# Patient Record
Sex: MALE | Race: WHITE | Employment: OTHER | URBAN - METROPOLITAN AREA
[De-identification: names, ages, dates, MRNs, and addresses within clinical notes are randomized per-mention and may not be internally consistent; named-entity substitution may affect disease eponyms.]

---

## 2020-07-12 ENCOUNTER — APPOINTMENT (OUTPATIENT)
Dept: GENERAL RADIOLOGY | Age: 85
End: 2020-07-12
Payer: MEDICARE

## 2020-07-12 ENCOUNTER — HOSPITAL ENCOUNTER (EMERGENCY)
Age: 85
Discharge: ANOTHER HOSPITAL | End: 2020-07-12
Attending: STUDENT IN AN ORGANIZED HEALTH CARE EDUCATION/TRAINING PROGRAM
Payer: MEDICARE

## 2020-07-12 VITALS
WEIGHT: 220 LBS | HEART RATE: 60 BPM | TEMPERATURE: 97.6 F | SYSTOLIC BLOOD PRESSURE: 88 MMHG | DIASTOLIC BLOOD PRESSURE: 51 MMHG | OXYGEN SATURATION: 93 % | RESPIRATION RATE: 26 BRPM

## 2020-07-12 LAB
ANION GAP SERPL CALCULATED.3IONS-SCNC: 9 MMOL/L (ref 3–16)
APTT: 34.3 SEC (ref 24.2–36.2)
BASOPHILS ABSOLUTE: 0 K/UL (ref 0–0.2)
BASOPHILS RELATIVE PERCENT: 1 %
BUN BLDV-MCNC: 25 MG/DL (ref 7–20)
CALCIUM SERPL-MCNC: 8.2 MG/DL (ref 8.3–10.6)
CHLORIDE BLD-SCNC: 103 MMOL/L (ref 99–110)
CO2: 24 MMOL/L (ref 21–32)
CREAT SERPL-MCNC: 1.5 MG/DL (ref 0.8–1.3)
EOSINOPHILS ABSOLUTE: 0.1 K/UL (ref 0–0.6)
EOSINOPHILS RELATIVE PERCENT: 3.3 %
GFR AFRICAN AMERICAN: 53
GFR NON-AFRICAN AMERICAN: 44
GLUCOSE BLD-MCNC: 107 MG/DL (ref 70–99)
HCT VFR BLD CALC: 34.6 % (ref 40.5–52.5)
HEMOGLOBIN: 11.3 G/DL (ref 13.5–17.5)
INR BLD: 0.97 (ref 0.86–1.14)
LYMPHOCYTES ABSOLUTE: 1.1 K/UL (ref 1–5.1)
LYMPHOCYTES RELATIVE PERCENT: 24.8 %
MCH RBC QN AUTO: 30.9 PG (ref 26–34)
MCHC RBC AUTO-ENTMCNC: 32.7 G/DL (ref 31–36)
MCV RBC AUTO: 94.6 FL (ref 80–100)
MONOCYTES ABSOLUTE: 0.5 K/UL (ref 0–1.3)
MONOCYTES RELATIVE PERCENT: 11 %
NEUTROPHILS ABSOLUTE: 2.7 K/UL (ref 1.7–7.7)
NEUTROPHILS RELATIVE PERCENT: 59.9 %
PDW BLD-RTO: 16.1 % (ref 12.4–15.4)
PLATELET # BLD: 198 K/UL (ref 135–450)
PMV BLD AUTO: 8.6 FL (ref 5–10.5)
POTASSIUM REFLEX MAGNESIUM: 4.6 MMOL/L (ref 3.5–5.1)
PROTHROMBIN TIME: 11.3 SEC (ref 10–13.2)
RBC # BLD: 3.66 M/UL (ref 4.2–5.9)
SODIUM BLD-SCNC: 136 MMOL/L (ref 136–145)
TROPONIN: 0.05 NG/ML
TROPONIN: <0.01 NG/ML
WBC # BLD: 4.5 K/UL (ref 4–11)

## 2020-07-12 PROCEDURE — 93005 ELECTROCARDIOGRAM TRACING: CPT | Performed by: STUDENT IN AN ORGANIZED HEALTH CARE EDUCATION/TRAINING PROGRAM

## 2020-07-12 PROCEDURE — 85610 PROTHROMBIN TIME: CPT

## 2020-07-12 PROCEDURE — 2580000003 HC RX 258: Performed by: STUDENT IN AN ORGANIZED HEALTH CARE EDUCATION/TRAINING PROGRAM

## 2020-07-12 PROCEDURE — 99285 EMERGENCY DEPT VISIT HI MDM: CPT

## 2020-07-12 PROCEDURE — 80048 BASIC METABOLIC PNL TOTAL CA: CPT

## 2020-07-12 PROCEDURE — 71045 X-RAY EXAM CHEST 1 VIEW: CPT

## 2020-07-12 PROCEDURE — 85730 THROMBOPLASTIN TIME PARTIAL: CPT

## 2020-07-12 PROCEDURE — 85025 COMPLETE CBC W/AUTO DIFF WBC: CPT

## 2020-07-12 PROCEDURE — 84484 ASSAY OF TROPONIN QUANT: CPT

## 2020-07-12 RX ORDER — CARVEDILOL 25 MG/1
25 TABLET ORAL 2 TIMES DAILY WITH MEALS
COMMUNITY

## 2020-07-12 RX ORDER — RISPERIDONE 0.5 MG/1
1.5 TABLET, FILM COATED ORAL NIGHTLY
COMMUNITY

## 2020-07-12 RX ORDER — FUROSEMIDE 40 MG/1
40 TABLET ORAL
COMMUNITY

## 2020-07-12 RX ORDER — OLANZAPINE 5 MG/1
5 TABLET ORAL 2 TIMES DAILY
COMMUNITY

## 2020-07-12 RX ORDER — OLANZAPINE 5 MG/1
5 TABLET, ORALLY DISINTEGRATING ORAL 2 TIMES DAILY
COMMUNITY
End: 2020-07-12 | Stop reason: CLARIF

## 2020-07-12 RX ORDER — LANOLIN ALCOHOL/MO/W.PET/CERES
3 CREAM (GRAM) TOPICAL DAILY
COMMUNITY
End: 2020-07-12 | Stop reason: CLARIF

## 2020-07-12 RX ORDER — OMEPRAZOLE 20 MG/1
40 CAPSULE, DELAYED RELEASE ORAL DAILY
COMMUNITY
End: 2020-07-12 | Stop reason: CLARIF

## 2020-07-12 RX ORDER — OMEPRAZOLE 40 MG/1
40 CAPSULE, DELAYED RELEASE ORAL DAILY
COMMUNITY

## 2020-07-12 RX ORDER — 0.9 % SODIUM CHLORIDE 0.9 %
500 INTRAVENOUS SOLUTION INTRAVENOUS ONCE
Status: COMPLETED | OUTPATIENT
Start: 2020-07-12 | End: 2020-07-12

## 2020-07-12 RX ORDER — TAMSULOSIN HYDROCHLORIDE 0.4 MG/1
0.4 CAPSULE ORAL DAILY
COMMUNITY

## 2020-07-12 RX ORDER — NITROGLYCERIN 0.4 MG/1
0.4 TABLET SUBLINGUAL EVERY 5 MIN PRN
COMMUNITY

## 2020-07-12 RX ORDER — AMIODARONE HYDROCHLORIDE 200 MG/1
200 TABLET ORAL 2 TIMES DAILY
COMMUNITY

## 2020-07-12 RX ORDER — POLYETHYLENE GLYCOL 3350 17 G/17G
17 POWDER, FOR SOLUTION ORAL DAILY PRN
COMMUNITY

## 2020-07-12 RX ADMIN — SODIUM CHLORIDE 500 ML: 9 INJECTION, SOLUTION INTRAVENOUS at 14:04

## 2020-07-12 ASSESSMENT — ENCOUNTER SYMPTOMS
SORE THROAT: 0
NAUSEA: 0
SHORTNESS OF BREATH: 0
BACK PAIN: 0
COUGH: 0
EYE REDNESS: 0
VOMITING: 0
ABDOMINAL PAIN: 0
EYE PAIN: 0
DIARRHEA: 0

## 2020-07-12 NOTE — ED PROVIDER NOTES
[address] ENCOUNTER          ATTENDING PHYSICIAN NOTE       Date of evaluation: 7/12/2020    Chief Complaint     Chest Pain      History of Present Illness     Terry Houser is a 80 y.o. male who presents patient is an 51-year-old male with a history of hypertension, MI, and CHF with last ejection fraction noted to be approximately 30% who presents with chest pain. Patient states that he experienced acute onset of chest pain last night without diaphoresis, nausea, or syncope, states that it got worse this morning prompting family to call 911. Patient received nitroglycerin from EMS with improvement of chest pain, states that he is on anticoagulation but is unsure what particular medication, family is on vacation and unable to provide additional information for the patient. On arrival patient is hemodynamically stable, states the chest pain has resolved after nitroglycerin and is in no acute distress, saturating well on room air. Patient denies recent fevers, cough, or shortness of breath, denies vomiting, diarrhea, abdominal pain or urinary symptoms. Of note patient had a stress test earlier this year that showed anterior wall motion abnormality likely secondary to previous MI but without inducible abnormality, EF at that time was noted to be 30%. Review of Systems     Review of Systems   Constitutional: Positive for fatigue. Negative for chills and fever. HENT: Negative for congestion and sore throat. Eyes: Negative for pain and redness. Respiratory: Negative for cough and shortness of breath. Cardiovascular: Positive for chest pain. Negative for leg swelling. Gastrointestinal: Negative for abdominal pain, diarrhea, nausea and vomiting. Genitourinary: Negative for dysuria and hematuria. Musculoskeletal: Negative for back pain, neck pain and neck stiffness. Skin: Negative for rash and wound. Neurological: Negative for syncope and light-headedness. Hematological: Does not bruise/bleed easily. Psychiatric/Behavioral: Negative for confusion. Past Medical, Surgical, Family, and Social History     He has a past medical history of Cardiac pacemaker, Chronic kidney disease, Dementia ([address] GERD (gastroesophageal reflux disease), Heart failure ([address] and Insomnia. He has no past surgical history on file. His family history is not on file. He reports that he has never smoked. He has never used smokeless tobacco. He reports previous alcohol use. He reports previous drug use. Medications     Previous Medications    AMIODARONE (CORDARONE) 200 MG TABLET    Take 200 mg by mouth 2 times daily     CARVEDILOL (COREG) 25 MG TABLET    Take 25 mg by mouth 2 times daily (with meals)    FUROSEMIDE (LASIX) 40 MG TABLET    Take 40 mg by mouth three times a week Mon-Wed-Fri    NITROGLYCERIN (NITROSTAT) 0.4 MG SL TABLET    Place 0.4 mg under the tongue every 5 minutes as needed for Chest pain up to max of 3 total doses. If no relief after 1 dose, call 911. OLANZAPINE (ZYPREXA) 5 MG TABLET    Take 5 mg by mouth 2 times daily    OMEPRAZOLE (PRILOSEC) 40 MG DELAYED RELEASE CAPSULE    Take 40 mg by mouth daily    POLYETHYLENE GLYCOL (MIRALAX) 17 G PACK PACKET    Take 17 g by mouth daily as needed    RISPERIDONE (RISPERDAL) 0.5 MG TABLET    Take 1.5 mg by mouth nightly     TAMSULOSIN (FLOMAX) 0.4 MG CAPSULE    Take 0.4 mg by mouth daily       Allergies     He has No Known Allergies. Physical Exam     INITIAL VITALS: BP: 92/60, Temp: 97.6 °F (36.4 °C), Pulse: 60, Resp: 18, SpO2: 96 %   Physical Exam  Constitutional:       General: He is not in acute distress. Appearance: Normal appearance. He is normal weight. He is not ill-appearing, toxic-appearing or diaphoretic. HENT:      Head: Normocephalic and atraumatic. Right Ear: External ear normal.      Left Ear: External ear normal.      Nose: Nose normal. No congestion.       Mouth/Throat:      Mouth: Mucous membranes are moist.      Pharynx: Oropharynx is clear. Eyes:      General: No scleral icterus. Right eye: No discharge. Left eye: No discharge. Conjunctiva/sclera: Conjunctivae normal.      Pupils: Pupils are equal, round, and reactive to light. Neck:      Musculoskeletal: Normal range of motion and neck supple. No neck rigidity. Cardiovascular:      Rate and Rhythm: Normal rate and regular rhythm. Pulses: Normal pulses. Pulmonary:      Effort: Pulmonary effort is normal. No respiratory distress. Abdominal:      General: Abdomen is flat. There is no distension. Palpations: Abdomen is soft. Tenderness: There is no abdominal tenderness. There is no guarding or rebound. Musculoskeletal: Normal range of motion. General: No swelling or deformity. Right lower leg: No edema. Left lower leg: No edema. Skin:     General: Skin is warm and dry. Capillary Refill: Capillary refill takes less than 2 seconds. Findings: No rash. Neurological:      General: No focal deficit present. Mental Status: He is alert and oriented to person, place, and time. Mental status is at baseline. Cranial Nerves: No cranial nerve deficit. Sensory: No sensory deficit. Motor: No weakness. Coordination: Coordination normal.   Psychiatric:         Mood and Affect: Mood normal.         Diagnostic Results     EKG   EKG Interpretation    Interpreted by emergency department physician    Rhythm: paced  Rate: normal  Axis: left  Ectopy: none  Conduction: nonspecific interventricular conduction block  ST Segments: no acute change  T Waves: no acute change    Clinical Impression: non-specific EKG    Kesha Sosa      RADIOLOGY:  XR CHEST PORTABLE   Final Result      Streaky bilateral lower lobe atelectasis and/or pneumonitis.           LABS:   Results for orders placed or performed during the hospital encounter of 96/87/46   Basic Metabolic Panel w/ Reflex to after nitroglycerin, he is hemodynamically stable at this time, saturating well on room air. Patient had a stress test approximately 3 months ago that redemonstrated old MI and decreased ejection fraction without inducible wall motion abnormality. We will plan to obtain laboratory tests including troponin and chest x-ray while closely monitoring patient, at this time I do not feel that he requires pain medication but we will continue to reassess. Patient received 325 aspirin by EMS. Patient's laboratory work notable for initial troponin of 0.05 which is up from patient's most recently measured value from outside hospital, also found to have APARNA with creatinine of 1.5, remainder of laboratory work appeared to be approximately patient's baseline. Repeat evaluation patient continues to deny shortness of breath at this time, discussed patient with his family members who requested that he be transferred to [facility] as this is where he receives the majority of his care and this is where his cardiologist practices. Discussed patient with both cardiologist and hospitalist at [facility] and they have agreed for transfer, I spoke with Dr. Demetri Segura has accepted patient. Final disposition for this patient will be transferred to [facility] for continued evaluation/monitoring of chest pain with elevated troponin, I personally spoke with the accepting medical provider. Clinical Impression     1. NSTEMI (non-ST elevated myocardial infarction) ([address]        Disposition     PATIENT REFERRED TO:  No follow-up provider specified.   [facility], Dr. Reina Severin:  New Prescriptions    No medications on file       DISPOSITION Decision To Transfer 07/12/2020 04:07:39 PM       Nae Molina MD  07/12/20 [id number]

## 2020-07-12 NOTE — ED NOTES
Bed: A03-03  Expected date:   Expected time:   Means of arrival:   Comments:   Bro Hernandez RN  07/12/20 [id number]

## 2020-07-13 LAB
EKG ATRIAL RATE: 59 BPM
EKG DIAGNOSIS: NORMAL
EKG P-R INTERVAL: 164 MS
EKG Q-T INTERVAL: 600 MS
EKG QRS DURATION: 166 MS
EKG QTC CALCULATION (BAZETT): 604 MS
EKG R AXIS: -81 DEGREES
EKG T AXIS: 141 DEGREES
EKG VENTRICULAR RATE: 61 BPM

## 2020-07-27 ENCOUNTER — APPOINTMENT (OUTPATIENT)
Dept: CT IMAGING | Age: 85
End: 2020-07-27
Payer: MEDICARE

## 2020-07-27 ENCOUNTER — HOSPITAL ENCOUNTER (EMERGENCY)
Age: 85
Discharge: HOME | End: 2020-07-27
Attending: EMERGENCY MEDICINE
Payer: MEDICARE

## 2020-07-27 VITALS
DIASTOLIC BLOOD PRESSURE: 98 MMHG | OXYGEN SATURATION: 94 % | RESPIRATION RATE: 14 BRPM | WEIGHT: 206 LBS | TEMPERATURE: 96.2 F | SYSTOLIC BLOOD PRESSURE: 129 MMHG

## 2020-07-27 PROCEDURE — 70486 CT MAXILLOFACIAL W/O DYE: CPT

## 2020-07-27 PROCEDURE — 90715 TDAP VACCINE 7 YRS/> IM: CPT | Performed by: PHYSICIAN ASSISTANT

## 2020-07-27 PROCEDURE — 6360000002 HC RX W HCPCS: Performed by: PHYSICIAN ASSISTANT

## 2020-07-27 PROCEDURE — 99284 EMERGENCY DEPT VISIT MOD MDM: CPT

## 2020-07-27 PROCEDURE — 70450 CT HEAD/BRAIN W/O DYE: CPT

## 2020-07-27 PROCEDURE — 72125 CT NECK SPINE W/O DYE: CPT

## 2020-07-27 PROCEDURE — 90471 IMMUNIZATION ADMIN: CPT | Performed by: PHYSICIAN ASSISTANT

## 2020-07-27 RX ADMIN — TETANUS TOXOID, REDUCED DIPHTHERIA TOXOID AND ACELLULAR PERTUSSIS VACCINE, ADSORBED 0.5 ML: 5; 2.5; 8; 8; 2.5 SUSPENSION INTRAMUSCULAR at 19:47

## 2020-07-27 ASSESSMENT — ENCOUNTER SYMPTOMS
VOMITING: 0
SHORTNESS OF BREATH: 0
ABDOMINAL PAIN: 0
NAUSEA: 0

## 2020-07-27 NOTE — ED TRIAGE NOTES
Pt arrived via ems with fall at [facility] in pt bedroom, unwitnessed unknown time down, head wrapped prior to EMS arrival, large amount of blood to left side of head/scalf and ear area, large jellied clots noted, unable to identify lac site, pt oriented to his own ability per EMS and report hey received, hx dementia, pt with slurred speech and pin point wolf pupils, responsve and is aware that is at hospital, no signs of acute distress

## 2020-07-27 NOTE — ED NOTES
Bed: A10-10  Expected date:   Expected time:   Means of arrival:   Comments:  [facility] [address], RN  07/27/20 5411

## 2020-07-28 NOTE — ED PROVIDER NOTES
[address] ENCOUNTER          PHYSICIAN ASSISTANT NOTE       Date of evaluation: 7/27/2020    Chief Complaint     Fall and Head Injury      History of Present Illness     Nguyen Alcantara is a 80 y.o. male who presents with complaints of fall. Patient states he was sitting in a chair and reached down to  a magazine from the floor. Patient fell forward and hit his head on the ground. Patient denies any loss of consciousness. Per EMS report the facility felt him to be at baseline mental status. Patient is denying any complaints at this time. He denies any fever, chills, headache, blurred vision, double vision, neck pain, chest pain, shortness of breath, nausea, vomiting, or abdominal pain. No blood thinner use. Review of Systems     Review of Systems   Constitutional: Negative for chills and fever. Eyes: Negative for visual disturbance. Respiratory: Negative for shortness of breath. Cardiovascular: Negative for chest pain. Gastrointestinal: Negative for abdominal pain, nausea and vomiting. Neurological: Negative for syncope and headaches. Past Medical, Surgical, Family, and Social History     He has a past medical history of Cardiac pacemaker, Chronic kidney disease, Dementia ([facility] [address] GERD (gastroesophageal reflux disease), Heart failure ([facility] [address] and Insomnia. He has no past surgical history on file. His family history is not on file. He reports that he has never smoked. He has never used smokeless tobacco. He reports previous alcohol use. He reports previous drug use.     Medications     Previous Medications    AMIODARONE (CORDARONE) 200 MG TABLET    Take 200 mg by mouth 2 times daily     CARVEDILOL (COREG) 25 MG TABLET    Take 25 mg by mouth 2 times daily (with meals)    FUROSEMIDE (LASIX) 40 MG TABLET    Take 40 mg by mouth three times a week Mon-Wed-Fri    NITROGLYCERIN (NITROSTAT) 0.4 MG SL TABLET    Place 0.4 mg under the tongue every 5 minutes as needed for Chest pain up to max of 3 total doses. If no relief after 1 dose, call 911. OLANZAPINE (ZYPREXA) 5 MG TABLET    Take 5 mg by mouth 2 times daily    OMEPRAZOLE (PRILOSEC) 40 MG DELAYED RELEASE CAPSULE    Take 40 mg by mouth daily    POLYETHYLENE GLYCOL (MIRALAX) 17 G PACK PACKET    Take 17 g by mouth daily as needed    RISPERIDONE (RISPERDAL) 0.5 MG TABLET    Take 1.5 mg by mouth nightly     TAMSULOSIN (FLOMAX) 0.4 MG CAPSULE    Take 0.4 mg by mouth daily       Allergies     He has No Known Allergies. Physical Exam     INITIAL VITALS: BP: 116/60, Temp: 96.2 °F (35.7 °C),  , Resp: 14, SpO2: 99 %    General: 80 y.o. male in no apparent distress, well developed, well nourished, non-toxic appearance. HEENT: Atraumatic, normocephalic. EOMs intact. PERRLA. Pupils 2mm bilat. Left side of head covered in blood. No wound, laceration, or abrasion appreciated. Ecchymosis and swelling to forehead. Tenderness to palpation of forehead and maxilla. Bilateral ear canals patent without blood. TMs intact and in neutral position. No evidence of bleeding from nasal cavity. Neck:  Full range of motion. No midline or paraspinal tenderness to palpation. Chest/pulm: Respiratory rate normal. Speaks in complete sentences, no respiratory distress, lungs CTA bilaterally, no wheezes, rales, rhonchi. Cardiovascular: Heart rate normal. RRR, no murmurs, rubs, gallops appreciated. Abdomen: No gross distension. Soft, non-tender, non-peritoneal. No suprapubic or CVAT. : Deferred. Musculoskeletal: Ambulates without difficulty. No evident long bone or joint deformity. Neuro: A&O x 4. Normal speech without dysarthria or aphasia. Moves all extremities spontaneously and symmetrically. Movements normal without ataxia. 5/5 Strength. Neuro vascular intact. Skin: Warm, dry. No obvious rashes, petechiae, or purpura. Psych: Appropriate mood and affect, normal interaction.      Diagnostic Results RADIOLOGY:  CT FACIAL BONES WO CONTRAST   Final Result      1. No findings for acute intracranial abnormality. 2.  Diffuse cerebral atrophy with extensive periventricular white matter changes bilaterally consistent with chronic small vessel ischemia. 3.  Frontal scalp edema without underlying skull fracture. CT FACIAL BONES. HISTORY: Fall      FINDINGS: Thin section axial images obtained through the facial bones. Multiplanar reconstruction images received for interpretation. Low radiation dose CT technique utilized. Orbital floor and rims are intact. Zygomatic arches are unremarkable. Visualized paranasal sinuses are clear. There is rightward nasal septal deviation. Nasal turbinates are intact. Ostiomeatal complexes are patent. Orbits are unremarkable. Middle    cranial fossa appears within normal limits. Visualized mandible and maxilla are intact. Temporomandibular joints are maintained. Frontal scalp edema. No underlying skull fracture. Nasal bones and anterior nasal spine are unremarkable. IMPRESSION:      1. No findings for acute traumatic facial bone abnormality. 2.  Rightward nasal septal deviation. CT CERVICAL SPINE WITHOUT CONTRAST      HISTORY: Fall with pain      FINDINGS: Thin section axial images obtained through the cervical spine. Multiplanar reconstruction images received for interpretation. Low radiation dose CT technique utilized. Vertebral body height is intact. There is extensive multilevel degenerative spondylosis with severe disc space narrowing, endplate changes and hypertrophic spurring greatest at the C3-C4 through C6-C7 disc levels. There is grade 1 anterolisthesis of C2    on C3 and C3 C7 on T1 likely degenerative. Posterior elements and facets are maintained. Odontoid and atlantoaxial joints are intact. Prevertebral soft tissues appear within normal limits.       Thin section axial images obtained through the cervical spine. Skull base appears within normal limits. Arch of C1 is intact. There is no acute fracture identified. There is extensive multilevel facet arthropathy contributing to significant neural    foraminal narrowing throughout the cervical spine. Level canal stenosis greatest rightward at the cc 3 C4 and C4-C5 disc levels. Lung apices are clear. Paravertebral soft tissues are intact. IMPRESSION:      1. No discrete findings for acute traumatic cervical spine abnormality. 2.  Extensive multilevel degenerative disc disease with disc space narrowing, endplate spurring and multilevel facet arthropathy. These findings continue to neural foraminal narrowing and canal stenosis at multiple levels. 3.  Grade 1 anterolisthesis of C2 on C3 and C7 on T1, likely degenerative. Cervical      CT CERVICAL SPINE WO CONTRAST   Final Result      1. No findings for acute intracranial abnormality. 2.  Diffuse cerebral atrophy with extensive periventricular white matter changes bilaterally consistent with chronic small vessel ischemia. 3.  Frontal scalp edema without underlying skull fracture. CT FACIAL BONES. HISTORY: Fall      FINDINGS: Thin section axial images obtained through the facial bones. Multiplanar reconstruction images received for interpretation. Low radiation dose CT technique utilized. Orbital floor and rims are intact. Zygomatic arches are unremarkable. Visualized paranasal sinuses are clear. There is rightward nasal septal deviation. Nasal turbinates are intact. Ostiomeatal complexes are patent. Orbits are unremarkable. Middle    cranial fossa appears within normal limits. Visualized mandible and maxilla are intact. Temporomandibular joints are maintained. Frontal scalp edema. No underlying skull fracture. Nasal bones and anterior nasal spine are unremarkable. IMPRESSION:      1.   No findings for acute traumatic facial bone abnormality. 2.  Rightward nasal septal deviation. CT CERVICAL SPINE WITHOUT CONTRAST      HISTORY: Fall with pain      FINDINGS: Thin section axial images obtained through the cervical spine. Multiplanar reconstruction images received for interpretation. Low radiation dose CT technique utilized. Vertebral body height is intact. There is extensive multilevel degenerative spondylosis with severe disc space narrowing, endplate changes and hypertrophic spurring greatest at the C3-C4 through C6-C7 disc levels. There is grade 1 anterolisthesis of C2    on C3 and C3 C7 on T1 likely degenerative. Posterior elements and facets are maintained. Odontoid and atlantoaxial joints are intact. Prevertebral soft tissues appear within normal limits. Thin section axial images obtained through the cervical spine. Skull base appears within normal limits. Arch of C1 is intact. There is no acute fracture identified. There is extensive multilevel facet arthropathy contributing to significant neural    foraminal narrowing throughout the cervical spine. Level canal stenosis greatest rightward at the cc 3 C4 and C4-C5 disc levels. Lung apices are clear. Paravertebral soft tissues are intact. IMPRESSION:      1. No discrete findings for acute traumatic cervical spine abnormality. 2.  Extensive multilevel degenerative disc disease with disc space narrowing, endplate spurring and multilevel facet arthropathy. These findings continue to neural foraminal narrowing and canal stenosis at multiple levels. 3.  Grade 1 anterolisthesis of C2 on C3 and C7 on T1, likely degenerative. Cervical      CT Head WO Contrast   Final Result      1. No findings for acute intracranial abnormality. 2.  Diffuse cerebral atrophy with extensive periventricular white matter changes bilaterally consistent with chronic small vessel ischemia. 3.  Frontal scalp edema without underlying skull fracture. CT FACIAL BONES. HISTORY: Fall      FINDINGS: Thin section axial images obtained through the facial bones. Multiplanar reconstruction images received for interpretation. Low radiation dose CT technique utilized. Orbital floor and rims are intact. Zygomatic arches are unremarkable. Visualized paranasal sinuses are clear. There is rightward nasal septal deviation. Nasal turbinates are intact. Ostiomeatal complexes are patent. Orbits are unremarkable. Middle    cranial fossa appears within normal limits. Visualized mandible and maxilla are intact. Temporomandibular joints are maintained. Frontal scalp edema. No underlying skull fracture. Nasal bones and anterior nasal spine are unremarkable. IMPRESSION:      1. No findings for acute traumatic facial bone abnormality. 2.  Rightward nasal septal deviation. CT CERVICAL SPINE WITHOUT CONTRAST      HISTORY: Fall with pain      FINDINGS: Thin section axial images obtained through the cervical spine. Multiplanar reconstruction images received for interpretation. Low radiation dose CT technique utilized. Vertebral body height is intact. There is extensive multilevel degenerative spondylosis with severe disc space narrowing, endplate changes and hypertrophic spurring greatest at the C3-C4 through C6-C7 disc levels. There is grade 1 anterolisthesis of C2    on C3 and C3 C7 on T1 likely degenerative. Posterior elements and facets are maintained. Odontoid and atlantoaxial joints are intact. Prevertebral soft tissues appear within normal limits. Thin section axial images obtained through the cervical spine. Skull base appears within normal limits. Arch of C1 is intact. There is no acute fracture identified. There is extensive multilevel facet arthropathy contributing to significant neural    foraminal narrowing throughout the cervical spine.   Level canal stenosis greatest rightward at the cc 3 C4 and C4-C5 disc levels. Lung apices are clear. Paravertebral soft tissues are intact. IMPRESSION:      1. No discrete findings for acute traumatic cervical spine abnormality. 2.  Extensive multilevel degenerative disc disease with disc space narrowing, endplate spurring and multilevel facet arthropathy. These findings continue to neural foraminal narrowing and canal stenosis at multiple levels. 3.  Grade 1 anterolisthesis of C2 on C3 and C7 on T1, likely degenerative. Cervical          LABS:   No results found for this visit on 07/27/20. RECENT VITALS:  BP: (!) 129/98, Temp: 96.2 °F (35.7 °C),  , Resp: 14, SpO2: 94 %       ED Course     Nursing Notes, Past Medical Hx,Past Surgical Hx, Social Hx, Allergies, and Family Hx were reviewed. The patient was given the following medications:  Orders Placed This Encounter   Medications    Tetanus-Diphth-Acell Pertussis (BOOSTRIX) injection 0.5 mL       CONSULTS:  None    MEDICAL DECISION MAKING / ASSESSMENT / Barrettjailene Rodriguez is a 80 y.o. male presenting with complaints of fall. On presentation, patient is well-appearing and in no acute distress. Patient is afebrile with stable VS.    Given the mechanism of injury and the patient's advanced age we obtained a CT head, max face, and neck. No acute abnormalities were shown on any of these studies. The left side of the patient's head was covered in what and clotted blood. We thoroughly cleaned the patient's face and scalp but were unable to find a source of the blood. No active bleeding was appreciated. Patient's tetanus status was updated. Patient is mentating well and appears to be at his baseline. This was a mechanical fall and I do not believe he merits any further evaluation or management in the emergency department at this time. At this point in time, patient is stable for discharge.  Patient given strict return precautions as outlined in the AVS. Patient was agreeable and understanding to this plan of care. Prior to discharge, patient was PO tolerant. This patient was also evaluated by the attending physician. All care plans were discussed and agreed upon. Clinical Impression     1.  Hematoma of scalp, initial encounter        Disposition     PATIENT REFERRED TO:  Teresa Santos MD  [address] [place] [address]  [phone]            DISCHARGE MEDICATIONS:  New Prescriptions    No medications on file       DISPOSITION Decision To Discharge 07/27/2020 07:53:12 PM        Liz Ferguson  07/27/20 [id number]